# Patient Record
Sex: MALE | HISPANIC OR LATINO | URBAN - METROPOLITAN AREA
[De-identification: names, ages, dates, MRNs, and addresses within clinical notes are randomized per-mention and may not be internally consistent; named-entity substitution may affect disease eponyms.]

---

## 2019-01-09 ENCOUNTER — OFFICE VISIT (OUTPATIENT)
Dept: URBAN - METROPOLITAN AREA CLINIC 48 | Facility: CLINIC | Age: 59
End: 2019-01-09
Payer: COMMERCIAL

## 2019-01-09 DIAGNOSIS — E11.3393 TYPE 2 DIABETES MELLITUS W/ MODERATE NONPROLIFERATIVE DIABETIC RETINOPATHY W/O MACULAR EDEMA OF BILATERAL EYES: Primary | ICD-10-CM

## 2019-01-09 PROCEDURE — 92014 COMPRE OPH EXAM EST PT 1/>: CPT | Performed by: OPTOMETRIST

## 2019-01-09 RX ORDER — PREDNISOLONE ACETATE 10 MG/ML
1 % SUSPENSION/ DROPS OPHTHALMIC
Qty: 1 | Refills: 1 | Status: INACTIVE
Start: 2019-01-09 | End: 2019-01-09

## 2019-01-09 RX ORDER — OFLOXACIN 3 MG/ML
0.3 % SOLUTION/ DROPS OPHTHALMIC
Qty: 1 | Refills: 1 | Status: INACTIVE
Start: 2019-01-09 | End: 2019-01-09

## 2019-01-09 ASSESSMENT — INTRAOCULAR PRESSURE
OS: 13
OD: 15

## 2019-01-09 NOTE — IMPRESSION/PLAN
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 Plan: Non-proliferative retinopathy found on today's examination. Relatively low risk of progression to PDR at this time. Discussed signs and symptoms of CNVM formation. Discussed importance of blood sugar, blood pressure and cholesterol control. Pt to RTC PRN with any change in visual status. Discussed retinal healing lag time from start of good A1C control for up to 2 years. Letter with results of today's examination sent to managing provider.

## 2020-03-04 ENCOUNTER — OFFICE VISIT (OUTPATIENT)
Dept: URBAN - METROPOLITAN AREA CLINIC 48 | Facility: CLINIC | Age: 60
End: 2020-03-04
Payer: COMMERCIAL

## 2020-03-04 DIAGNOSIS — H25.11 AGE-RELATED NUCLEAR CATARACT, RIGHT EYE: ICD-10-CM

## 2020-03-04 DIAGNOSIS — E11.3292 TYPE 2 DIAB W MILD NONPRLF DIABETIC RTNOP W/O MACULAR EDEMA, LEFT EYE: Primary | ICD-10-CM

## 2020-03-04 PROCEDURE — 92014 COMPRE OPH EXAM EST PT 1/>: CPT | Performed by: OPHTHALMOLOGY

## 2020-03-04 ASSESSMENT — INTRAOCULAR PRESSURE
OS: 16
OD: 15

## 2020-03-04 NOTE — IMPRESSION/PLAN
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 Plan: Advised to obtain good blood glucose control RTC 9 month DM/OCT mac ( long exam)

## 2020-03-04 NOTE — IMPRESSION/PLAN
Impression: Age-related nuclear cataract, right eye: H25.11. Plan: Patient not symptomatic, RTC  1 year cat eval if patient is symptomatic

## 2021-09-23 ENCOUNTER — OFFICE VISIT (OUTPATIENT)
Dept: URBAN - METROPOLITAN AREA CLINIC 48 | Facility: CLINIC | Age: 61
End: 2021-09-23
Payer: COMMERCIAL

## 2021-09-23 PROCEDURE — 99214 OFFICE O/P EST MOD 30 MIN: CPT | Performed by: OPTOMETRIST

## 2021-09-23 PROCEDURE — 92134 CPTRZ OPH DX IMG PST SGM RTA: CPT | Performed by: OPTOMETRIST

## 2021-09-23 ASSESSMENT — INTRAOCULAR PRESSURE
OD: 14
OS: 15

## 2021-09-23 NOTE — IMPRESSION/PLAN
Impression: Type 2 diab w moderate nonprlf diab rtnop w/o macular edema, bilateral: P93.7841. Plan: Slight increase seen on exam today. Discussed small cyst on OCT with the patient. Recommend further evaluation with Dr. Maya Ward for possible treatment. Discussed with patient the importance of glucose control and ocular risk to prevent the progression to Retinopathy. 
 
RTC 1 week for DE/OCT Mac

## 2021-10-29 ENCOUNTER — OFFICE VISIT (OUTPATIENT)
Dept: URBAN - METROPOLITAN AREA CLINIC 48 | Facility: CLINIC | Age: 61
End: 2021-10-29
Payer: COMMERCIAL

## 2021-10-29 DIAGNOSIS — E11.3313 TYPE 2 DIAB W MODERATE NONPRLF DIAB RTNOP W MACULAR EDEMA, BILATERAL: Primary | ICD-10-CM

## 2021-10-29 PROCEDURE — 92014 COMPRE OPH EXAM EST PT 1/>: CPT | Performed by: OPHTHALMOLOGY

## 2021-10-29 PROCEDURE — 92134 CPTRZ OPH DX IMG PST SGM RTA: CPT | Performed by: OPHTHALMOLOGY

## 2021-10-29 ASSESSMENT — INTRAOCULAR PRESSURE
OD: 12
OS: 12

## 2021-10-29 NOTE — IMPRESSION/PLAN
Impression: Type 2 diab w moderate nonprlf diab rtnop w macular edema, bilateral: E40.5420. Plan: Advised to obtain good blood glucose control, no treatment at this time. Discussed observation with good glucose control. 



RTC 10 weeks DE/OCT Ashley Joyce